# Patient Record
(demographics unavailable — no encounter records)

---

## 2024-11-15 NOTE — REASON FOR VISIT
[FreeTextEntry1] : Abdominal pain, diarrhea, bleeding, anemia, iron deficiency, Meckel's diverticulum, history of Crohn's, Internal hemorrhoids

## 2024-11-15 NOTE — HISTORY OF PRESENT ILLNESS
[FreeTextEntry1] : 58-year-old female Complex history Diagnosed several years ago with mild ileal Crohn's Loose stools, responsive to Entocort, subsequent biologic therapy More recent medical events include recurrence of diarrhea unresponsive to corticosteroids or biologic Abdominal pains mainly postprandial, associated with severe bright red blood per rectum, anemia, iron deficiency, hospitalizations multiple, transfusions....  Extensive workup failed to reveal any specific GI pathology beyond large bleeding internal hemorrhoids for which the patient received banding as well as repeated embolizations by interventional radiology  Multiple colonoscopies did not show any sign of prior ileal Crohn's, normal colon including random biopsies, large internal hemorrhoids with active bleeding during colonoscopy examinations.  Standard upper endoscopy is also unrevealing.  Patient did have inpatient Meckel scan which was positive confirmed by second localization study  Subsequent exploratory laparoscopy however did not reveal any evidence of Meckel's diverticulum, no resection performed.  Patient continued to suffer from abdominal pain attacks and bleeding, Notably did finally respond to intravenous iron, with normalization of blood counts while her abdominal pain continued associated with loose stools.  Less pain when she does not eat, but pain attacks not strictly associated with eating  Pain primarily occurring in the right lower quadrant or bandlike across the upper abdomen. Pain reportedly can last for hours, has awaken patient in the middle of the night, along with the bowel movements.  CT and MR enterography studies failed to reveal any bleeding source or any signs of bowel inflammation or lesion  Patient had locally performed imaging reviewed by family friend, radiologist [place] Suspicion for small bowel GIST, leading to scheduling of balloon enteroscopy at [facility]  As patient's complaints accelerated, continued, patient found an earlier appointment at [facility] where balloon enteroscopy was performed, without any abnormalities noted. Study reportedly not as extensive as desired by endoscopist.  Patient here to discuss next steps in management

## 2024-11-15 NOTE — ASSESSMENT
[FreeTextEntry1] :   Abdominal pains and diarrhea of unclear etiology Still some occasional bleeding Complaints may be functional, but factors such as nocturnal symptoms less supportive of a functional diagnosis Cannot rule out recurrence of her Crohn's disease, even if this was not active and contributing to prior complaints  Additionally, clarified with patient the question of GIST Patient reports that additional radiology opinion specifically identified an abnormality on imaging not reported by our own radiologist.  At this time, have asked the patient to have this radiologist reach out to me via email so they can specify the exact images and question for further internal rereview, with further recommendations to follow  If there is still no evidence of a mass lesion on rereview of studies, would recommend repeat capsule endoscopy to investigate for any recurrence of her Crohn's disease.

## 2024-12-10 NOTE — ASSESSMENT
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  I have reviewed her imaging studies and discussed with her in detail. She had a Meckel scan which showed abnormal uptake in the left upper quadrant and was also found to have a small bowel intussusception but other imaging studies have not revealed any small bowel abnormalities.  A diagnostic laparoscopy and aborted double balloon enteroscopy were also reported as normal.   We discussed further work-up at this time given the small bowel intussusception and findings on Meckel scan suggesting abnormal uptake in the left upper quadrant.  I have recommended we obtain labs to evaluate her recent GI bleeding and start a PPI in the event there is unopposed acid secretion in the small bowel.  We currently do not offer DBE at [facility] but should have the equipment sometime early next year.  We will discuss other options for further work-up including an EUS.

## 2024-12-10 NOTE — HISTORY OF PRESENT ILLNESS
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  Pt has a complex history and had been thought to have a history of Crohns disease previously on [facility] with several recent hospitalizations for acute anemia and bleeding per rectum.  She is followed by colorectal surgery for hemorrhoids and also underwent percutaneous embolization of the superior rectal artery branches on 8/13 and 8/28/2024.  She had a colonoscopy in 7/24 which revealed large hemorrhoids.   The patient was hospitalized in early September, requiring a blood transfusion. During that hospitalization, a Meckel scan was performed on September 6th, showing abnormal uptake in the proximal small bowel and also a finding of a jejunal intussusception.   EGD/colonoscopy was performed not revealing a source of bleeding.  She underwent a diagnostic laparoscopy and lysis of adhesions by Dr. Sampson on 9/10/2024 which did not reveal any abnormalities. Following hospital discharge the patient was evaluated at [place], where she underwent a double balloon enteroscopy and the scope reached the mid-portion of the jejunum but couldn't advance further due to looping and possible mucosal trauma.   For the past two weeks, she has been experiencing altered BMs which she describes as dark stool with blood associated with cramping.  She also reports left lower quadrant tenderness.  No lightheadedness/dizziness nor chest pain.

## 2025-02-25 NOTE — PHYSICAL EXAM
[Chaperone Present] : A chaperone was present in the examining room during all aspects of the physical examination [Well developed] : well developed [Well Nourished] : ~L well nourished [Good Hygeine] : demonstrates good hygeine [Warm and Dry] : was warm and dry to touch [Normal] : normal external genitalia [Normal Appearance] : general appearance was normal [Aa ____] : Aa [unfilled] [Ba ____] : Ba [unfilled] [C ____] : C [unfilled] [GH ____] : GH [unfilled] [TVL ____] : TVL  [unfilled] [Ap ____] : Ap [unfilled] [Bp ____] : Bp [unfilled] [Absent] : absent [Post Void Residual ____ml] : post void residual was [unfilled] ml [FreeTextEntry2] : Lance [Tenderness] : ~T no ~M abdominal tenderness observed [Distended] : not distended [FreeTextEntry4] : + vaginal vault prolapse

## 2025-02-25 NOTE — HISTORY OF PRESENT ILLNESS
[Unable To Restrain Bowel Movement] : no [Urinary Frequency] : no [Urinary Tract Infection] : no [FreeTextEntry4] : saw some slight blood when first dropped  [de-identified] : last few days  [de-identified] : 1-2 [de-identified] : Sometimes [FreeTextEntry1] : prolapse got worse one week ago had rectal embolization  Patient's chart was reviewed.  She underwent a laparoscopic robotic supracervical hysterectomy right salpingo-oophorectomy left salpingectomy sacrocolpopexy and retropubic mid urethral sling in March 2018 She subsequently developed a recurrence and underwent a vaginal trachelectomy sacrospinous suspension and anterior and posterior repair and cystoscopy in September 2018.

## 2025-02-25 NOTE — COUNSELING
[FreeTextEntry1] : I reviewed the above findings with the patient with visual illustrations. Treatment options for the prolapse were discussed and included doing nothing, Kegel exercises and behavioral modification, a pessary, or surgical correction.  We discussed fitting with a pessary today as she is uncomfortable with the prolapse

## 2025-02-25 NOTE — HISTORY OF PRESENT ILLNESS
[Unable To Restrain Bowel Movement] : no [Urinary Frequency] : no [Urinary Tract Infection] : no [FreeTextEntry4] : saw some slight blood when first dropped  [de-identified] : last few days  [de-identified] : 1-2 [de-identified] : Sometimes [FreeTextEntry1] : prolapse got worse one week ago had rectal embolization  Patient's chart was reviewed.  She underwent a laparoscopic robotic supracervical hysterectomy right salpingo-oophorectomy left salpingectomy sacrocolpopexy and retropubic mid urethral sling in March 2018 She subsequently developed a recurrence and underwent a vaginal trachelectomy sacrospinous suspension and anterior and posterior repair and cystoscopy in September 2018.

## 2025-02-25 NOTE — DISCUSSION/SUMMARY
Patient Instructions:    -No changes on vent settings today will continue 20/6 with rate of 18.  Download of ventilator and etco2 will be requested to [facility] [place] to determine if ok to continue with current settings    -Airway clearance: Vest TID with ipratropium and normal saline, followed by cough assist   - Pulmicort bid   - albuterol will be used only as needed with Vest treatments for cough, wheezing or shortness of breath  - hold off on pulmozyme     -continue Robinul BID plus an extra as needed for increased oral secretions    -atropine sublingual three times a day, you can try using this prn if tolerated (go back if suctioning becomes more often or if she has a hard time with pooled secretions in her mouth    - oximetry spot checks during the daytime, overnight continuous     sick plan for airway clearance as follows:   -Use oximetry all day and night   -Cough assist q4hrs and extra every 30 min prn desats lower than 92%   -If hypoxemia continues please contact pulmonologist on call or come to the ED    Dietician will be contacted to assess nutrition    Please call the pulmonary nurse line ([phone]) with questions, concerns and prescription refill requests during business hours. For urgent concerns after hours and on the weekends, please contact the on call pulmonologist ([phone]).    Follow up in 2 months    Thank you for the opportunity to participate in Maggie's care.     Lucie Knowles MD    Pediatric Department  Division of Pediatric Pulmonology and Sleep Medicine  Pager # [phone]  Email: [email or web address]    
[FreeTextEntry1] : We discussed surgical options of repeat sacrocolpopexy, repeat vaginal native tissue repair, or vaginal closure.  Risks and benefits of the procedures were discussed.  She does have a history of Crohn's disease and we discussed the increased risk of morbidity with the laparoscopic\robotic procedure with a sacrocolpopexy.  She will return for cystoscopy and pessary check and will further discuss surgical treatment options.  All questions were answered.
[FreeTextEntry1] : We discussed surgical options of repeat sacrocolpopexy, repeat vaginal native tissue repair, or vaginal closure.  Risks and benefits of the procedures were discussed.  She does have a history of Crohn's disease and we discussed the increased risk of morbidity with the laparoscopic\robotic procedure with a sacrocolpopexy.  She will return for cystoscopy and pessary check and will further discuss surgical treatment options.  All questions were answered.

## 2025-03-17 NOTE — HISTORY OF PRESENT ILLNESS
[FreeTextEntry1] : Patient for follow-up on her pelvic organ prolapse.  Her pessary fell out after was placed on her last visit.  We discussed treatment options of surgery versus trying another pessary she is interested in surgical management.  Surgically she is interested in a repeat sacrocolpopexy and we discussed possible posterior repair risks of morbidity with the surgical procedure as a repeat procedure were discussed and she wishes to proceed with scheduling.  I UG a patient information on sacrocolpopexy, robotic surgery, and posterior repair with possible written on top was given to her.  All questions were answered

## 2025-03-24 NOTE — HISTORY OF PRESENT ILLNESS
[FreeTextEntry1] : Mini is a 59yo P1 here for pre-surgical counseling.  Patient denies any new complaints.  She continues to desire surgical management.  Her pre-operative workup is as follows:  Cystoscopy (3/17/25): no abnormalities noted   [x] UCx collected today [ ] Medical clearance   PMH: [facility] PSH: endoscopy x4, RA-ANNABELLA, RSO, LS, SCP, RP MUS, cysto (3/2018)--> prolapse recurrence--> vaginal trachelectomy, SSLF, A/P repair, cysto 2018, dx laparoscopy and IAN (r/o Meckles diverticulum) 2024 OBGYN: denies abnormal paps or PMB; 1  Allergies: contrast media Meds: Vit D Fam: denies coagulopathies; reports maternal GDM with ovarian cancer at 46 yo Soc: denies substance use

## 2025-03-24 NOTE — END OF VISIT
[FreeTextEntry3] : Patient seen, I have reviewed the above and agree with all pertinent clinical information including history and physical examination and agree with treatment plan.

## 2025-03-24 NOTE — PHYSICAL EXAM
[Chaperone Present] : A chaperone was present in the examining room during all aspects of the physical examination [No Acute Distress] : in no acute distress [Well developed] : well developed [Well Nourished] : ~L well nourished [Oriented x3] : oriented to person, place, and time [Respirations regular] : ~T respiratory rate was regular [Labia Majora] : were normal [Labia Minora] : were normal [Normal Appearance] : general appearance was normal [Aa ____] : Aa [unfilled] [Ba ____] : Ba [unfilled] [C ____] : C [unfilled] [GH ____] : GH [unfilled] [PB ____] : PB [unfilled] [TVL ____] : TVL  [unfilled] [Ap ____] : Ap [unfilled] [Bp ____] : Bp [unfilled] [Absent] : absent [Normal] : no abnormalities [FreeTextEntry2] : MIGUELANGEL Lau [FreeTextEntry4] : + vaginal vault prolapse

## 2025-03-24 NOTE — PROCEDURE
[Straight Catheterization] : insertion of a straight catheter [Other ___] : [unfilled] [Patient] : the patient [Consent Obtained] : written consent was obtained prior to the procedure and is detailed in the patient's record [None] : there were no complications with the catheter insertion [Culture] : culture [No Complications] : no complications [Tolerated Well] : the patient tolerated the procedure well

## 2025-03-24 NOTE — DISCUSSION/SUMMARY
[FreeTextEntry1] : Patient is a 59y/o who presents with vaginal vault prolapse. We reviewed management options for her prolapse including: observation, pelvic floor exercises, resuming pessary use, surgical management. We reviewed various surgical management options including vaginal, laparoscopic/robotic, and abdominal procedures. We also reviewed both mesh and non-mesh options. She continues to be interested in repeat robotic sacrocolpopexy. She discussed her left ovary is still in place and she would like removed, if possible, given her family history of ovarian cancer. Discussed we may not be able to safely remove depending on presence of adhesions. She voiced understanding.  We reviewed risks of the surgery including but not limited to: bleeding, infection, pain, urinary retention, failure to reduce prolapse, prolapse recurrence, persistence or worsening of stress urinary incontinence, development of urge incontinence, voiding dysfunction, dyspareunia, bowel obstruction, venous thromboembolism (VTE), fistula formation, neuropathy, and mesh erosion. We discussed the risk of possible conversion to open surgery via exploratory laparotomy. We discussed the risk of injury to her bladder, ureters, urethra, vagina, rectum and bowel. We discussed that her prior surgical history puts her at increased risk of surgical morbidity and conversion to open surgery. We discussed the possibility of going home with a catheter. The risks and procedure details were explained in layman's terms and she expressed understanding of all of these risks. We discussed the elective nature of the surgery and we discussed that she is choosing to undergo this surgery despite awareness and understanding of procedure risks. We reviewed her hospital stay as well as preoperative and postoperative instructions.   Patient signed consent for: pelvic exam under anesthesia, robotic/laparoscopic-assisted sacrocolpopexy, cystoscopy, possible anterior/posterior repair, possible laparotomy, possible oophrectomy, possible mesh revision. Pt understands that pelvic exam under anesthesia can be performed by learners (medical students/residents/fellows). Patient verbalized understanding. All questions answered.

## 2025-04-16 NOTE — PHYSICAL EXAM
[MA] : MA [Labia Majora] : were normal [Labia Minora] : were normal [Normal] : was normal [Normal Appearance] : general appearance was normal [Atrophy] : atrophy [Absent] : absent [Adnexa Absent] : absent bilaterally [Post Void Residual ____ml] : post void residual was [unfilled] ml [FreeTextEntry2] : Lance [Tenderness] : ~T no ~M abdominal tenderness observed [Distended] : not distended [de-identified] : apex well-suspended, no mesh erosion or tenderness

## 2025-04-16 NOTE — SUBJECTIVE
[FreeTextEntry1] : Feels generally well [FreeTextEntry7] : Pain well-controlled [FreeTextEntry6] : Tolerating regular diet without nausea [FreeTextEntry5] : Reports incomplete emptying, urinary urgency, UUI, dysuria, and frequency for the last few days. Denies BOONE. Denies fever or chills.  [FreeTextEntry4] : BMs are intermittent consistency at baseline from her Crohn's. BMs are daily, soft with colace.  [FreeTextEntry3] : Ambulating well without issue

## 2025-04-16 NOTE — PHYSICAL EXAM
[MA] : MA [Labia Majora] : were normal [Labia Minora] : were normal [Normal] : was normal [Normal Appearance] : general appearance was normal [Atrophy] : atrophy [Absent] : absent [Adnexa Absent] : absent bilaterally [Post Void Residual ____ml] : post void residual was [unfilled] ml [FreeTextEntry2] : Lance [Tenderness] : ~T no ~M abdominal tenderness observed [Distended] : not distended [de-identified] : apex well-suspended, no mesh erosion or tenderness

## 2025-04-16 NOTE — DISCUSSION/SUMMARY
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 She has urinary sx c/w OAB.   #OAB - Urinary sx c/w preexisting OAB. UDip today negative for UTI and PVR 5 normal, no evidence for incomplete emptying at this time.  - Plan to start trial of Gemtesa daily. Pyridium PRN for bladder spasm - Reassess sx in 2 weeks, offered reassurance that symptoms should improve as she heals from surgery.   #Postop - Healing well postoperatively overall - Affirm collected to r/o vaginal infection as cause of sx - Postoperative restrictions, instructions, and bleeding precautions reviewed. - Surgical pathology benign, reviewed with patient as below Final Diagnosis 1. Peritoneum, biopsy: - Fibroadipose connective tissue with pigment laden macrophages, reactive mesothelial cells, adhesions, and chronic inflammation. 2. Mesh: For gross examination only. 3. Ovary, left, oophorectomy: - Benign ovary.  She was counseled to call if any questions and RTO in 2-3 weeks or sooner, should issues arise. Patient verbalized understanding. All questions answered.

## 2025-04-16 NOTE — END OF VISIT
[FreeTextEntry3] : Patient seen, above reviewed and discussed. I have reviewed the above and agree with all pertinent clinical information including history and physical examination and agree with treatment plan.

## 2025-05-01 NOTE — CONSULT LETTER
[Dear  ___] : Dear  [unfilled], [Consult Letter:] : I had the pleasure of evaluating your patient, [unfilled]. [Please see my note below.] : Please see my note below. [Consult Closing:] : Thank you very much for allowing me to participate in the care of this patient.  If you have any questions, please do not hesitate to contact me. [Sincerely,] : Sincerely, [FreeTextEntry3] : Attila Estevez MD [facility] @ [place] [address] [place] [address] [address] [place] [address] Tel: [phone] Fax: [phone]

## 2025-05-01 NOTE — DATA REVIEWED
[de-identified] : Hearing Test performed to evaluate the extent of hearing loss and  to explain pt's symptoms  was personally reviewed and revealed Tymps-wnl Hearing-bilat SNHL

## 2025-05-01 NOTE — HISTORY OF PRESENT ILLNESS
[de-identified] : 57y/o old female who came in for hearing loss L>R that has been going on for years. Her ears feel clogged today L>R. Pt denies any ear pain, drainage, tinnitus, ear surgery or ear trauma. She has not had a hearing test within the past year.  no other modifying factors no other nasal or throat complaints

## 2025-05-01 NOTE — PROCEDURE
[FreeTextEntry3] : Cerumen impaction removed with curette from left ear canal. After removal of cerumen canal noted to be normal without edema, purulence or inflammation. Patient tolerated procedure well. yes

## 2025-05-01 NOTE — END OF VISIT
[FreeTextEntry3] : I personally saw and examined BELLE MILLER in detail.I have made changes in changes in the body of the note where appropriate. I personally reviewed the HPI, PMH, FH, SH, ROS and medications/allergies. I have spoken to Doreen TOBIN regarding the history and have personally determined the assessment and plan of care and documented this myself.. I performed all procedures and performed the physical exam. I have made changes in the body of the note where appropriate.   Attesting Faculty: See Attending Signature Below

## 2025-05-01 NOTE — ASSESSMENT
[FreeTextEntry1] : -Audio shows -SNHL bilateral sensorineural hearing loss-cleared for hearing aids -F/u  6 months and prn  wax- Rx: . Routine debridement suggested to keep the ears free of wax.

## 2025-05-01 NOTE — PHYSICAL EXAM
[Midline] : trachea located in midline position [de-identified] : right: wnl Left: wax [Normal] : no rashes

## 2025-05-08 NOTE — OBJECTIVE
[Post Void Residual ____ ml] : Post Void Residual was [unfilled] ml [Soft and Nontender] : soft and nontender [Clean, Dry, Intact] : Clean, Dry, Intact [Good Support] : Good support [Healing well] : healing well [No Masses or Tenderness] : no masses or tenderness [FreeTextEntry3] : No evidence of mesh exposure.

## 2025-05-08 NOTE — DISCUSSION/SUMMARY
[FreeTextEntry1] : BELLE is a 58 year who is s/p RA-mesh removal, SCP, LO, peritoneal bx, cysto (4/10/25), doing well.  #Postop State - Reviewed postoperative instructions and restrictions. - Pt doing well; understands she needs to call for any issues or present to ER for any acute changes. - Discussed that patient is meeting post operative milestones.  - Avoid anything inside vagina including tampon and sexual intercourse for 6 weeks post-op.  Avoid lifting anything heavier than 10 pounds for 6 weeks post-op.  Continue to avoid constipation using miralax/colace PRN. - Reassured patient that she is fully emptying her bladder. PVR 0 ml via bladder scanner. Reviewed voiding assist techniques (double void, leaning assist, crede). Patient understands and in agreement.   #OAB: - Reviewed behavioral modifications and bladder irritants. Patient to start fluid and behavioral modifications.  - RTO in 2 months or sooner for follow up.   All questions answered to pt satisfaction.  Pt can call the office with any additional questions or concerns; pt verbalized understanding.

## 2025-05-08 NOTE — SUBJECTIVE
[FreeTextEntry1] : Overall, feeling well. [FreeTextEntry8] : Tylenol prn. [FreeTextEntry7] : Well controlled with Tylenol prn.  [FreeTextEntry6] : Tolerating diet without nausea/vomiting.  [FreeTextEntry5] : Reports that she was prescribed Cipro x 5 days by her PMD after her last visit (4/16/25) and her UTI symptoms improved significantly. She continues to have intermittent urinary urgency and nocturia x 3 (similar to prior to surgery). She did not start Gemtesa as advised at her last visit because her symptoms improved after taking Cipro. Denies dysuria, urinary frequency, or leakage. No BOONE. Reports intermittent feeling of incomplete bladder emptying.  [FreeTextEntry4] : Regular, at baseline from her Crohn's.  [FreeTextEntry3] : Tolerating without lightheadedness/dizziness.  [FreeTextEntry9] : Denies vaginal bleeding. No abnormal vaginal discharge.

## 2025-07-07 NOTE — PHYSICAL EXAM
[Well developed] : well developed [Well Nourished] : ~L well nourished [Good Hygeine] : demonstrates good hygeine [Normal Mood/Affect] : mood and affect are normal [Warm and Dry] : was warm and dry to touch [Normal] : normal external genitalia [Normal Appearance] : general appearance was normal [Cystocele] : a cystocele [1] : 1 [Absent] : absent [Aa ____] : Aa [unfilled] [Ba ____] : Ba [unfilled] [C ____] : C [unfilled] [GH ____] : GH [unfilled] [TVL ____] : TVL  [unfilled] [Ap ____] : Ap [unfilled] [Bp ____] : Bp [unfilled] [] : I [FreeTextEntry2] : Judi [Anxiety] : patient is not anxious [Tenderness] : ~T no ~M abdominal tenderness observed [Distended] : not distended

## 2025-07-07 NOTE — HISTORY OF PRESENT ILLNESS
[FreeTextEntry1] : Patient returns today with complaints of recurrence of her prolapse.  Her chart was reviewed.  She is status post a repeat sacrocolpopexy in April.

## 2025-07-07 NOTE — DISCUSSION/SUMMARY
[FreeTextEntry1] : I reviewed the above findings with the patient.  She has had several procedures for her prolapse and has had recurrence.  She was unable to do a Kegel exercise in the office today and we discussed going for physical therapy to see if she can increase some of her pelvic floor muscle strength and tone.  We did discuss additional surgical management with anterior and posterior repair however I thought it was in her best interest to first try pelvic floor physical therapy once again.  All questions were answered.